# Patient Record
Sex: MALE | URBAN - NONMETROPOLITAN AREA
[De-identification: names, ages, dates, MRNs, and addresses within clinical notes are randomized per-mention and may not be internally consistent; named-entity substitution may affect disease eponyms.]

---

## 2017-11-16 ENCOUNTER — APPOINTMENT (OUTPATIENT)
Dept: LAB | Age: 32
End: 2017-11-16
Attending: FAMILY MEDICINE
Payer: COMMERCIAL

## 2017-11-16 ENCOUNTER — OFFICE VISIT (OUTPATIENT)
Dept: FAMILY MEDICINE CLINIC | Facility: CLINIC | Age: 32
End: 2017-11-16

## 2017-11-16 VITALS
HEART RATE: 72 BPM | BODY MASS INDEX: 24.59 KG/M2 | OXYGEN SATURATION: 99 % | DIASTOLIC BLOOD PRESSURE: 60 MMHG | HEIGHT: 69 IN | TEMPERATURE: 98 F | WEIGHT: 166 LBS | SYSTOLIC BLOOD PRESSURE: 122 MMHG

## 2017-11-16 DIAGNOSIS — E78.00 HYPERCHOLESTEROLEMIA: ICD-10-CM

## 2017-11-16 DIAGNOSIS — M54.50 CHRONIC BILATERAL LOW BACK PAIN WITHOUT SCIATICA: ICD-10-CM

## 2017-11-16 DIAGNOSIS — G89.29 CHRONIC BILATERAL LOW BACK PAIN WITHOUT SCIATICA: ICD-10-CM

## 2017-11-16 DIAGNOSIS — Z00.00 PREVENTATIVE HEALTH CARE: Primary | ICD-10-CM

## 2017-11-16 DIAGNOSIS — Z00.00 LABORATORY EXAM ORDERED AS PART OF ROUTINE GENERAL MEDICAL EXAMINATION: ICD-10-CM

## 2017-11-16 PROCEDURE — 36415 COLL VENOUS BLD VENIPUNCTURE: CPT | Performed by: FAMILY MEDICINE

## 2017-11-16 PROCEDURE — 80061 LIPID PANEL: CPT

## 2017-11-16 PROCEDURE — 99395 PREV VISIT EST AGE 18-39: CPT | Performed by: FAMILY MEDICINE

## 2017-11-16 PROCEDURE — 36415 COLL VENOUS BLD VENIPUNCTURE: CPT

## 2017-11-16 PROCEDURE — 80053 COMPREHEN METABOLIC PANEL: CPT

## 2017-11-16 PROCEDURE — 98926 OSTEOPATH MANJ 3-4 REGIONS: CPT | Performed by: FAMILY MEDICINE

## 2017-11-16 NOTE — H&P
Eloy Burris is a 33year old male who presents for a complete physical exam.   HPI:   Pt voices concerns of chronic lump on lower back. Having some back pain x 1 yr, has been going to a chiropractor.   Pain worse after doing squats, 7/10 today, pt no MUSCULOSKELETAL: see hpi  NEURO: denies headaches, tremors, dizziness, numbness and weakness  PSYCHE: denies depression or anxiety, denies any sleep difficulty,   HEMATOLOGIC: denies unexplained bruising or bleeding  ENDOCRINE: denies heat or cold intole over\"  OBSERVATION: smiling  NEURO: A &O X 3,cranial nerves are intact,motor and sensory are grossly intact, DTRs +2/4 UE/LE bilaterally  PSYCH: affect: bright, speech: clear and coherent, Insight: appropriate  ASSESSMENT AND PLAN:   Gina Fitch is

## 2017-11-16 NOTE — PATIENT INSTRUCTIONS
Reviewed age-appropriate preventive health screening exams. Discussed age-appropriate immunizations.   Patient declines flu vaccine  Reviewed goals for blood pressure, lipids and blood sugar  We will check fasting CMP and lipids  Discussed the benign natur

## 2018-02-27 ENCOUNTER — MED REC SCAN ONLY (OUTPATIENT)
Dept: FAMILY MEDICINE CLINIC | Facility: CLINIC | Age: 33
End: 2018-02-27

## 2023-09-21 ENCOUNTER — HOSPITAL ENCOUNTER (OUTPATIENT)
Age: 38
Discharge: HOME | End: 2023-09-21
Payer: COMMERCIAL

## 2023-09-21 ENCOUNTER — APPOINTMENT (OUTPATIENT)
Dept: ULTRASOUND IMAGING | Age: 38
End: 2023-09-21
Attending: NURSE PRACTITIONER
Payer: COMMERCIAL

## 2023-09-21 ENCOUNTER — APPOINTMENT (OUTPATIENT)
Dept: GENERAL RADIOLOGY | Age: 38
End: 2023-09-21
Attending: NURSE PRACTITIONER
Payer: COMMERCIAL

## 2023-09-21 VITALS
SYSTOLIC BLOOD PRESSURE: 117 MMHG | HEART RATE: 58 BPM | RESPIRATION RATE: 16 BRPM | OXYGEN SATURATION: 100 % | DIASTOLIC BLOOD PRESSURE: 77 MMHG | BODY MASS INDEX: 25.18 KG/M2 | TEMPERATURE: 97 F | HEIGHT: 69 IN | WEIGHT: 170 LBS

## 2023-09-21 DIAGNOSIS — M71.22 SYNOVIAL CYST OF LEFT POPLITEAL SPACE: ICD-10-CM

## 2023-09-21 DIAGNOSIS — M25.562 POSTERIOR KNEE PAIN, LEFT: Primary | ICD-10-CM

## 2023-09-21 PROCEDURE — 93971 EXTREMITY STUDY: CPT | Performed by: NURSE PRACTITIONER

## 2023-09-21 PROCEDURE — 99203 OFFICE O/P NEW LOW 30 MIN: CPT | Performed by: NURSE PRACTITIONER

## 2023-09-21 PROCEDURE — A6449 LT COMPRES BAND >=3" <5"/YD: HCPCS | Performed by: NURSE PRACTITIONER

## 2023-09-21 PROCEDURE — 73560 X-RAY EXAM OF KNEE 1 OR 2: CPT | Performed by: NURSE PRACTITIONER

## 2023-09-21 NOTE — ED INITIAL ASSESSMENT (HPI)
Per patient, 4 years ago, patient was training for a hunting trip and was doing a lot of running. States after a particular long run, he developed swelling behind his left knee. Patient felt tightness and some pain. Saw ortho, who suggested it may be a bakers cyst. Pt had a mri done and went through PT. States it eventually resolved without surgical intervention. Patient woke up yesterday with the same pain and swelling behind his left knee. Patient feels a tightness/tension to the proximal portion of his calf.